# Patient Record
Sex: MALE | Race: WHITE
[De-identification: names, ages, dates, MRNs, and addresses within clinical notes are randomized per-mention and may not be internally consistent; named-entity substitution may affect disease eponyms.]

---

## 2017-12-18 ENCOUNTER — HOSPITAL ENCOUNTER (OUTPATIENT)
Dept: HOSPITAL 45 - C.RAD | Age: 62
Discharge: HOME | End: 2017-12-18
Attending: SURGERY
Payer: COMMERCIAL

## 2017-12-18 DIAGNOSIS — K44.9: Primary | ICD-10-CM

## 2017-12-18 DIAGNOSIS — Z98.890: ICD-10-CM

## 2017-12-18 DIAGNOSIS — K21.9: ICD-10-CM

## 2017-12-18 NOTE — DIAGNOSTIC IMAGING REPORT
GI SERIES W/AIR ROUTINE



CLINICAL HISTORY: 62 years-old Male with HIATUS HERNIA.  Prior hiatal hernia

repair. Recent endoscopy with Sawyer's esophagus



TECHNIQUE:  A standard air contrast upper GI series was performed following

administration of barium and effervescent crystals.  Multiple spot fluoroscopic

images were obtained and provided for review.



COMPARISON STUDY: None available



FLUOROSCOPY TIME: 2.0 minutes.



FINDINGS: 

   

The patient swallowed barium without difficulty.  No aspiration was definitively

visualized.  The esophagus distended normally with barium and effervescent

crystals. Postsurgical changes of the stomach from prior Nissen fundoplication.

The entire Nissen wrap appears to be above the level of the diaphragm. There is

a large portion of the stomach which is extruded along the right lateral margin

of the wrap as seen on image 20 of 23 suggesting failed fundoplication. The

distal esophagus is patulous. There was significant gastroesophageal reflux

which extended to the upper thoracic esophagus.  Barium was seen to flow freely

through the gastroesophageal junction.   Evaluation of the stomach demonstrates

no gastric mucosal irregularity or filling defect.



The duodenal bulb and sweep appear unremarkable.  



IMPRESSION:

Prior Nissen fundoplication with findings as above suggesting failed Nissen wrap

with patulous distal esophagus and significant gastroesophageal reflux











The above report was generated using voice recognition software. It may contain

grammatical, syntax or spelling errors.











Electronically signed by:  David García M.D.

12/18/2017 9:44 AM



Dictated Date/Time:  12/18/2017 9:25 AM

## 2018-04-08 ENCOUNTER — HOSPITAL ENCOUNTER (EMERGENCY)
Dept: HOSPITAL 45 - C.EDA | Age: 63
Discharge: ANOTHER HOSPITAL | End: 2018-04-08
Payer: COMMERCIAL

## 2018-04-08 VITALS
HEIGHT: 70.98 IN | WEIGHT: 244.27 LBS | HEIGHT: 70.98 IN | WEIGHT: 244.27 LBS | BODY MASS INDEX: 34.2 KG/M2 | BODY MASS INDEX: 34.2 KG/M2

## 2018-04-08 VITALS
HEART RATE: 70 BPM | OXYGEN SATURATION: 97 % | SYSTOLIC BLOOD PRESSURE: 119 MMHG | TEMPERATURE: 98.24 F | DIASTOLIC BLOOD PRESSURE: 68 MMHG

## 2018-04-08 DIAGNOSIS — Z98.84: ICD-10-CM

## 2018-04-08 DIAGNOSIS — K22.70: ICD-10-CM

## 2018-04-08 DIAGNOSIS — Z98.890: ICD-10-CM

## 2018-04-08 DIAGNOSIS — K92.2: Primary | ICD-10-CM

## 2018-04-08 LAB
ALBUMIN SERPL-MCNC: 3.1 GM/DL (ref 3.4–5)
ALP SERPL-CCNC: 40 U/L (ref 45–117)
ALT SERPL-CCNC: 20 U/L (ref 12–78)
AST SERPL-CCNC: 10 U/L (ref 15–37)
BASOPHILS # BLD: 0.02 K/UL (ref 0–0.2)
BASOPHILS NFR BLD: 0.1 %
BUN SERPL-MCNC: 33 MG/DL (ref 7–18)
CA-I BLD-SCNC: 1.14 MMOL/L (ref 1.12–1.32)
CALCIUM SERPL-MCNC: 8.5 MG/DL (ref 8.5–10.1)
CO2 SERPL-SCNC: 26 MMOL/L (ref 21–32)
CREAT BLD-MCNC: 1.2 MG/DL (ref 0.6–1.3)
CREAT SERPL-MCNC: 1.26 MG/DL (ref 0.6–1.4)
EOS ABS #: 0.2 K/UL (ref 0–0.5)
EOSINOPHIL NFR BLD AUTO: 284 K/UL (ref 130–400)
GLUCOSE SERPL-MCNC: 218 MG/DL (ref 70–99)
HCT VFR BLD CALC: 35.5 % (ref 42–52)
HGB BLD-MCNC: 12 G/DL (ref 14–18)
IG#: 0.05 K/UL (ref 0–0.02)
IMM GRANULOCYTES NFR BLD AUTO: 15.9 %
INR PPP: 1 (ref 0.9–1.1)
ISTAT POTASSIUM: 3.6 MEQ/L (ref 3.3–5)
LYMPHOCYTES # BLD: 2.3 K/UL (ref 1.2–3.4)
MCH RBC QN AUTO: 30.8 PG (ref 25–34)
MCHC RBC AUTO-ENTMCNC: 33.8 G/DL (ref 32–36)
MCV RBC AUTO: 91.3 FL (ref 80–100)
MONO ABS #: 0.64 K/UL (ref 0.11–0.59)
MONOCYTES NFR BLD: 4.4 %
NEUT ABS #: 11.27 K/UL (ref 1.4–6.5)
NEUTROPHILS # BLD AUTO: 1.4 %
NEUTROPHILS NFR BLD AUTO: 77.9 %
PMV BLD AUTO: 9 FL (ref 7.4–10.4)
POTASSIUM SERPL-SCNC: 3.6 MMOL/L (ref 3.5–5.1)
PROT SERPL-MCNC: 6.4 GM/DL (ref 6.4–8.2)
PTT PATIENT: 21.7 SECONDS (ref 21–31)
RED CELL DISTRIBUTION WIDTH CV: 13.6 % (ref 11.5–14.5)
RED CELL DISTRIBUTION WIDTH SD: 45.6 FL (ref 36.4–46.3)
SODIUM BLD-SCNC: 140 MEQ/L (ref 135–144)
SODIUM SERPL-SCNC: 138 MMOL/L (ref 136–145)
WBC # BLD AUTO: 14.48 K/UL (ref 4.8–10.8)

## 2018-04-08 NOTE — DIAGNOSTIC IMAGING REPORT
CHEST ONE VIEW PORTABLE



CLINICAL HISTORY: Syncope. Upper gastrointestinal hemorrhage.    



COMPARISON STUDY:  12/12/2016



FINDINGS: The heart is at the upper limits of normal in size. There is a

retrocardiac opacity consistent with a hiatal hernia. There are linear by

basilar opacities, likely represent subsegmental atelectasis. There is no

failure. There are no pleural effusions. There is no free intraperitoneal air.[ 



IMPRESSION: 

1. Bibasilar subsegmental atelectatic change







Electronically signed by:  Dalton Maria M.D.

4/8/2018 6:42 AM



Dictated Date/Time:  4/8/2018 6:41 AM

## 2018-04-08 NOTE — EMERGENCY ROOM VISIT NOTE
History


Report prepared by Mohini:  Gama Huynh


Under the Supervision of:  Dr. Maci Armando M.D.


First contact with patient:  02:03


Stated Complaint:  SYNCOPE/GI BLEED





History of Present Illness


The patient is a 63 year old male who presents to the Emergency Room brought in 

by EMS with complaints of episodic vomiting x3 with bright red blood about one 

hour ago. He recently had an ablation treatment for Sawyer's Esophagus on 

March 27, 2018. The patient notes that he got up to go to the bathroom and then 

passed out. He was incontinent of formed stool.  He notes that when he came to 

he began vomiting bright red blood. He reports being incontinent of his stool. 

Per EMS, the patient had 3 syncopal episodes en route to the ED with additional 

hematemesis. He denies any liver problems or other underlying medical problems.





   Source of History:  patient


   Onset:  one hour ago


   Position:  other (general)


   Quality:  other (vomiting blood)


   Timing:  other (episodic)


   Associated Symptoms:  + LOC


Note:


He notes syncope and incontinent of his stool





Review of Systems


See HPI for pertinent positives & negatives. A total of 10 systems reviewed and 

were otherwise negative.





Past Medical & Surgical


Medical Problems:


(1) Sawyer esophagus


Surgical Problems:


(1) History of prior ablation treatment








Family History





No pertinent family history





Social History


Marital Status:  


Housing Status:  lives with significant other


Occupation Status:  employed





Current/Historical Medications


Scheduled


Ascorbic Acid (Vitamin C), 250 MG PO DAILY


Cholecalciferol (Vitamin D3), 1 TAB PO DAILY


Omega-3 Fatty Acids (Fish Oil 1200 mg), 1 CAP PO BID


Omeprazole (Prilosec), 40 MG PO DAILY


Sucralfate (Carafate), 1 GM PO QID


Vitamin E (Vitamin E 400 Iu), 400 INTER.UNIT PO DAILY





Allergies


Coded Allergies:  


     No Known Allergies (Unverified , 4/8/18)





Physical Exam


Vital Signs











  Date Time  Temp Pulse Resp B/P (MAP) Pulse Ox O2 Delivery O2 Flow Rate FiO2


 


4/8/18 04:38 36.8 70 20 119/68 97   


 


4/8/18 04:11  70 20 119/68 97 Nasal Cannula 2.0 


 


4/8/18 03:51  71 21 129/70 97 Nasal Cannula 2.0 


 


4/8/18 02:46    110/76    


 


4/8/18 02:44    109/78    


 


4/8/18 02:31        





    97/56    


 


4/8/18 02:30  68 15  96 Nasal Cannula 2.0 


 


4/8/18 02:24    106/61    


 


4/8/18 02:13  74      


 


4/8/18 02:08 36.8 75 18 123/70 89 Room Air  


 


4/8/18 02:01    123/70    











Physical Exam


Vital signs reviewed.





General: Pale-appearing, clammy, in no significant distress. Noted to be mildly 

hypotensive. Emesis basin with bright red blood.





HEENT: No scleral icterus, PERRLA, neck supple.  Atraumatic. Pale conjunctiva. 

Old blood in the mouth.





Cardiovascular: Regular rate and rhythm, no extra sounds.





Pulmonary: Clear to auscultation bilaterally, normal work of breathing.





Abdomen: Soft, nontender, nondistended, positive bowel sounds.





Musculoskeletal: Atraumatic, no peripheral edema. Dried blood noted to anterior 

chest wall





Neurologic: Patient awake alert and oriented x 3





Skin: Warm, dry, no rash





Medical Decision & Procedures


ER Provider


Diagnostic Interpretation:


Radiology results as stated below per my review and radiologist interpretation:





CHEST ONE VIEW PORTABLE





CLINICAL HISTORY: Syncope. Upper gastrointestinal hemorrhage.    





COMPARISON STUDY:  12/12/2016





FINDINGS: The heart is at the upper limits of normal in size. There is a


retrocardiac opacity consistent with a hiatal hernia. There are linear by


basilar opacities, likely represent subsegmental atelectasis. There is no


failure. There are no pleural effusions. There is no free intraperitoneal air.





IMPRESSION: 


1. Bibasilar subsegmental atelectatic change











Electronically signed by:  Dalton Maria M.D.


4/8/2018 6:42 AM





Dictated Date/Time:  4/8/2018 6:41 AM





Laboratory Results


4/8/18 02:10








Red Blood Count 3.89, Mean Corpuscular Volume 91.3, Mean Corpuscular Hemoglobin 

30.8, Mean Corpuscular Hemoglobin Concent 33.8, Mean Platelet Volume 9.0, 

Neutrophils (%) (Auto) 77.9, Lymphocytes (%) (Auto) 15.9, Monocytes (%) (Auto) 

4.4, Eosinophils (%) (Auto) 1.4, Basophils (%) (Auto) 0.1, Neutrophils # (Auto) 

11.27, Lymphocytes # (Auto) 2.30, Monocytes # (Auto) 0.64, Eosinophils # (Auto) 

0.20, Basophils # (Auto) 0.02





4/8/18 02:10

















Test


  4/8/18


02:10 4/8/18


02:14


 


White Blood Count


  14.48 K/uL


(4.8-10.8) 


 


 


Red Blood Count


  3.89 M/uL


(4.7-6.1) 


 


 


Hemoglobin


  12.0 g/dL


(14.0-18.0) 


 


 


Hematocrit 35.5 % (42-52)  


 


Mean Corpuscular Volume


  91.3 fL


() 


 


 


Mean Corpuscular Hemoglobin


  30.8 pg


(25-34) 


 


 


Mean Corpuscular Hemoglobin


Concent 33.8 g/dl


(32-36) 


 


 


Platelet Count


  284 K/uL


(130-400) 


 


 


Mean Platelet Volume


  9.0 fL


(7.4-10.4) 


 


 


Neutrophils (%) (Auto) 77.9 %  


 


Lymphocytes (%) (Auto) 15.9 %  


 


Monocytes (%) (Auto) 4.4 %  


 


Eosinophils (%) (Auto) 1.4 %  


 


Basophils (%) (Auto) 0.1 %  


 


Neutrophils # (Auto)


  11.27 K/uL


(1.4-6.5) 


 


 


Lymphocytes # (Auto)


  2.30 K/uL


(1.2-3.4) 


 


 


Monocytes # (Auto)


  0.64 K/uL


(0.11-0.59) 


 


 


Eosinophils # (Auto)


  0.20 K/uL


(0-0.5) 


 


 


Basophils # (Auto)


  0.02 K/uL


(0-0.2) 


 


 


RDW Standard Deviation


  45.6 fL


(36.4-46.3) 


 


 


RDW Coefficient of Variation


  13.6 %


(11.5-14.5) 


 


 


Immature Granulocyte % (Auto) 0.3 %  


 


Immature Granulocyte # (Auto)


  0.05 K/uL


(0.00-0.02) 


 


 


Prothrombin Time


  10.7 SECONDS


(9.0-12.0) 


 


 


Prothromb Time International


Ratio 1.0 (0.9-1.1) 


  


 


 


Activated Partial


Thromboplast Time 21.7 SECONDS


(21.0-31.0) 


 


 


Partial Thromboplastin Ratio 0.8  


 


Est Creatinine Clear Calc


Drug Dose 75.0 ml/min 


  


 


 


Estimated GFR (


American) 69.4 


  


 


 


Estimated GFR (Non-


American 59.9 


  


 


 


BUN/Creatinine Ratio 26.0 (10-20)  


 


Calcium Level


  8.5 mg/dl


(8.5-10.1) 


 


 


Total Bilirubin


  0.7 mg/dl


(0.2-1) 


 


 


Direct Bilirubin


  0.1 mg/dl


(0-0.2) 


 


 


Aspartate Amino Transf


(AST/SGOT) 10 U/L (15-37) 


  


 


 


Alanine Aminotransferase


(ALT/SGPT) 20 U/L (12-78) 


  


 


 


Alkaline Phosphatase


  40 U/L


() 


 


 


Total Protein


  6.4 gm/dl


(6.4-8.2) 


 


 


Albumin


  3.1 gm/dl


(3.4-5.0) 


 


 


Bedside Hemoglobin


  


  11.2 g/dl


(14.0-18.0)


 


Bedside Hematocrit  33 % (42-52) 


 


Bedside Sodium


  


  140 mEq/L


(135-144)


 


Bedside Potassium


  


  3.6 mEq/L


(3.3-5.0)


 


Bedside Chloride


  


  102 mEq/L


(101-112)


 


Bedside Total CO2


  


  24 mEq/l


(24-31)


 


Anion Gap


  


  19.0 mmol/L


(16-25)


 


Bedside Blood Urea Nitrogen


  


  31 mg/dl


(7-18)


 


Bedside Creatinine


  


  1.2 mg/dl


(0.6-1.3)


 


Bedside Glucose (other)


  


  226 mg/dl


(70-99)


 


Bedside Ionized Calcium (Patrick)


  


  1.14 mmol/l


(1.12-1.32)





Laboratory results per my review.





Medications Administered











 Medications


  (Trade)  Dose


 Ordered  Sig/Marni


 Route  Start Time


 Stop Time Status Last Admin


Dose Admin


 


 Sodium Chloride  1,000 ml @ 


 125 mls/hr  Q8H STAT


 IV  4/8/18 02:07


 4/8/18 05:13 DC 4/8/18 02:07


125 MLS/HR


 


 Pantoprazole


 Sodium 80 mg/


 Dextrose  120 ml @ 


 480 mls/hr  NOW  STAT


 IV  4/8/18 02:16


 4/8/18 02:30 DC 4/8/18 02:32


480 MLS/HR


 


 Pantoprazole


 Sodium 40 mg/


 Dextrose  100 ml @ 


 20 mls/hr  Q5H


 IV  4/8/18 02:30


 4/8/18 05:13 DC 4/8/18 02:32


20 MLS/HR











ECG Per My Interpretation


Indication:  syncope


Rate (beats per minute):  76


Rhythm:  normal sinus


Findings:  nonspecific-ST abn, no ectopy





ED Course


0200: Past medical records reviewed. The patient was evaluated in room A1. A 

complete history and physical examination was performed. 





0207: Ordered Sodium Chloride 1,000 ml @ 125 mls/hr IV





0210: Ordered Protonix IV Bolus/Drip 1 ea IV 





0211: I spoke with Dr. Bray, gastroenterologist. We discussed the patient's 

case. He recommends continuing resuscitation and they will scope the patient in 

a few hours. 





0216: Ordered Pantoprazole Sodium 80 mg/Dextrose 120 ml @ 480 mls/hr IV





0230: Ordered Pantoprazole Sodium 80 mg/Dextrose 100 ml @ 20 mls/hr IV





0243: I reassessed the patient at this time. He has not vomited while here in 

the ED. 





0317: I spoke with Dr. Bray, gastroenterologist. We discussed the patient's 

case. He states the surgeon who did the patients surgery accepted the patient 

for transfer. He spoke with Dr. Ami Hirsch, gastroenterologist who 

accepted the patient. The patient will be transferred to [facility].





0334: I reassessed the patient at this time. I discussed the results and 

treatment plan with the patient. I answered all pertaining questions that he 

had. He expressed understanding and verbalized agreement. The patient will be 

transferred for further care.





0356: I spoke with Dr. Grigsby, critical care at [facility]. They 

accepted the patient.





Medical Decision


Differential Diagnoses include: UGI bleed secondary to eroded vessel, PUD, polyp





This pt was evaluated and appeared to be in no distress.  He was noted to have 

a hypotensive episode in the ED after several syncopal episodes in route.  Pt 

is feeling improved.  GI, Dr Bray was consulted upon arrival.  Lab work was 

pending.  Pt was type and crossed for 2 units to hold.  ISTAT reveals a stable H

/H.  The etiology of the sudden bleed is unclear.  After d/w GI at [facility] by Dr Bray transfer to their facility was recommended.  The pt was 

accepted by Dr Grigsby of the ICU.  At this time, ground transportation is 

several hours until arrival in this ED, making pt's arrival in [place] > 4-5 

hours from now.  The pt is currently appearing well, making his syncope likely 

vagal in nature.  There is great concern on the part of Dr Bray, Dr. Ami Hirsch ([facility]) and myself that the pt will become unstable 

upon a rebleed in the near future.  Air transport was requested and the pt was 

sent to [facility] ICU for further management.  He wand his family were aware of the 

plan and agreed.





Medication Reconcilliation


Current Medication List:  was personally reviewed by me





Blood Pressure Screening


Patient's blood pressure:  Normal blood pressure





Consults


Time Called:  0208


Consulting Physician:  Dr. Bray, gastroenterologist


Returned Call:  0211


I spoke with Dr. Bray, gastroenterologist. We discussed the patient's case. He 

recommends continuing resuscitation and they will scope the patient in a few 

hours. 





0317: I spoke with Dr. Bray, gastroenterologist. We discussed the patient's 

case. He states the surgeon who did the patients surgery accepted the patient 

for transfer. He spoke with Dr. Ami Hirsch, gastroenterologist who 

accepted the patient. The patient will be transferred to [facility].


Additional Consults:  


   Time Called:  0345


   Consulted Physician:  Dr. Grigsby, critical care at [facility]


   Returned Call:  0356


Additional Comments:


I spoke with Dr. Grigsby, critical care at [facility]. They accepted 

the patient.





Impression





 Primary Impression:  


 Upper GI bleed


 Additional Impressions:  


 History of prior ablation treatment


 Sawyer esophagus





Critical Care


I have personally spent greater than 35 minutes of critical care time in the 

direct management of this patient.  This includes bedside care, interpretation 

of diagnostic studies, and testing, discussion with consultants, patient, and 

family members, and other required patient management activities.  This 35 

minutes is in excess of all separately billable procedures.





Scribe Attestation


The scribe's documentation has been prepared under my direction and personally 

reviewed by me in its entirety. I confirm that the note above accurately 

reflects all work, treatment, procedures, and medical decision making performed 

by me.





Departure Information


Dispostion


Transfer Acute Care Facility ([facility])





Referrals


Gris Peterson (PCP)





Problem Qualifiers

## 2018-04-08 NOTE — GASTROINTESTINAL CONSULTATION
Gastrointestinal Consultation


Date of Consultation:  Apr 8, 2018


History of Present Illness


Patient is a 63 year old male with a history of Sawyer's esophagus with 

dysplasia who presented for open access endoscopy and [place] in March 29.  He 

underwent nitrous oxide balloon cryoablation therapy for C4M5 Sawyer's 

esophagus.  Since that time he has had this difficulty swallowing.





This evening he went to bed and woke up in the middle night with crampy pain in 

his lower chest.  He then went to the bathroom felt very dizzy lightheaded, had 

bright red hematemesis and then his wife said that he just melted into the 

floor.  He had several episodes of hematemesis at home she called EMS, and, 

transferred to [facility].





While in the ER transport he again had 2-3 bouts of hematemesis and had syncopal

/presyncopal events.  Although I am unaware if he was hypertensive during his 

episodes.  Here in the emergency room he has been started on IV fluids 

resuscitated, he is awake and alert, has had no further vomiting of blood since 

in the ER. 





No pain, no fevers, chills, he does have a history a lap band and is thinking 

having it removed.





In the ER his BP is 118/70 and HR is 65-75 Hb is 12 and INR is 1.





I spoke with [facility] in regards to bleeding after ablation procedure, given the 

complexity of recent ablation, possible stricture complication afterwards, they 

have requested him to be transferred. 


Patient is agreeable.





Past Medical/Surgical History


Obesity


Lap gastric band


Past Surgical History:


Lap gastric band





Social History


Smoking Status:  Never Smoker





Allergies


Coded Allergies:  


     No Known Allergies (Unverified , 4/8/18)





Current Medications





Home Meds and Scripts








 Medications  Dose


 Route/Sig


 Max Daily Dose Days Date Category


 


 Vitamin D3


  (Cholecalciferol)


 1,000 Unit Tab  1 Tab


 PO DAILY


    4/8/18 Reported


 


 Vitamin E 400 Iu


  (Vitamin E) 400


 Unit Cap  400 Inter.unit


 PO DAILY


    4/8/18 Reported


 


 Vitamin C


  (Ascorbic Acid)


 250 Mg Tab  250 Mg


 PO DAILY


    4/8/18 Reported


 


 Prilosec


  (Omeprazole) 20


 Mg Capcr  40 Mg


 PO DAILY


    4/8/18 Reported


 


 Carafate


  (Sucralfate) 1 Gm


 Tab  1 Gm


 PO QID


    4/8/18 Reported


 


 Fish Oil 1200 mg


  (Omega-3 Fatty


 Acids) 1 Cap Cap  1 Cap


 PO BID


    4/8/18 Reported











Review of Systems


Constitutional:  + see HPI


Eyes:  No see HPI, No worsening of vision, No eye pain, No redness, No discharge

, No diplopia, No problem reported


ENT:  No see HPI, No hearing loss, No unusual epistaxis, No nasal symptoms, No 

sore throat, No tinnitus, No dental problems, No trouble swallowing, No pain on 

swallowing, No problem reported


Respiratory:  No see HPI, No cough, No sputum, No wheezing, No shortness of 

breath, No dyspnea on exertion, No dyspnea at rest, No hemoptysis, No problem 

reported


Cardiac:  No see HPI, No chest pain, No orthopnea, No PND, No edema, No 

claudication, No palpitations, No problem reported


Musculoskeletal:  No see HPI, No joint pain, No muscle pain, No swelling, No 

calf pain, No problem reported


Male :  No see HPI, No dysuria, No urinary frequency, No incontinence, No 

nocturia more than once/night, No slowing stream, No hematuria, No sexual 

dysfunction, No problem reported





Physical Exam











  Date Time  Temp Pulse Resp B/P (MAP) Pulse Ox O2 Delivery O2 Flow Rate FiO2


 


4/8/18 02:46    110/76    


 


4/8/18 02:44    109/78    


 


4/8/18 02:31        





    97/56    


 


4/8/18 02:30  68 15  96 Nasal Cannula 2.0 


 


4/8/18 02:24    106/61    


 


4/8/18 02:13  74      


 


4/8/18 02:08 36.8 75 18 123/70 89 Room Air  


 


4/8/18 02:01    123/70    








General Appearance:  WD/WN


Respiratory/Chest:  chest non-tender, lungs clear, normal breath sounds


Cardiovascular:  regular rate, rhythm, no edema, no gallop


Abdomen:  normal bowel sounds, non tender, soft, no organomegaly


Extremities:  normal range of motion, non-tender


Neurologic/Psych:  CNs II-XII nml as tested, no motor/sensory deficits


Skin:  normal color





Laboratory Results





Last 24 Hours








Test


  4/8/18


02:10 4/8/18


02:14


 


White Blood Count 14.48 K/uL  


 


Red Blood Count 3.89 M/uL  


 


Hemoglobin 12.0 g/dL  


 


Hematocrit 35.5 %  


 


Mean Corpuscular Volume 91.3 fL  


 


Mean Corpuscular Hemoglobin 30.8 pg  


 


Mean Corpuscular Hemoglobin


Concent 33.8 g/dl 


  


 


 


Platelet Count 284 K/uL  


 


Mean Platelet Volume 9.0 fL  


 


Neutrophils (%) (Auto) 77.9 %  


 


Lymphocytes (%) (Auto) 15.9 %  


 


Monocytes (%) (Auto) 4.4 %  


 


Eosinophils (%) (Auto) 1.4 %  


 


Basophils (%) (Auto) 0.1 %  


 


Neutrophils # (Auto) 11.27 K/uL  


 


Lymphocytes # (Auto) 2.30 K/uL  


 


Monocytes # (Auto) 0.64 K/uL  


 


Eosinophils # (Auto) 0.20 K/uL  


 


Basophils # (Auto) 0.02 K/uL  


 


RDW Standard Deviation 45.6 fL  


 


RDW Coefficient of Variation 13.6 %  


 


Immature Granulocyte % (Auto) 0.3 %  


 


Immature Granulocyte # (Auto) 0.05 K/uL  


 


Prothrombin Time 10.7 SECONDS  


 


Prothromb Time International


Ratio 1.0 


  


 


 


Activated Partial


Thromboplast Time 21.7 SECONDS 


  


 


 


Partial Thromboplastin Ratio 0.8  


 


Sodium Level 138 mmol/L  


 


Potassium Level 3.6 mmol/L  


 


Chloride Level 106 mmol/L  


 


Carbon Dioxide Level 26 mmol/L  


 


Anion Gap 6.0 mmol/L  19.0 mmol/L 


 


Blood Urea Nitrogen 33 mg/dl  


 


Creatinine 1.26 mg/dl  


 


Est Creatinine Clear Calc


Drug Dose 75.0 ml/min 


  


 


 


Estimated GFR (


American) 69.4 


  


 


 


Estimated GFR (Non-


American 59.9 


  


 


 


BUN/Creatinine Ratio 26.0  


 


Random Glucose 218 mg/dl  


 


Calcium Level 8.5 mg/dl  


 


Total Bilirubin 0.7 mg/dl  


 


Direct Bilirubin 0.1 mg/dl  


 


Aspartate Amino Transf


(AST/SGOT) 10 U/L 


  


 


 


Alanine Aminotransferase


(ALT/SGPT) 20 U/L 


  


 


 


Alkaline Phosphatase 40 U/L  


 


Total Protein 6.4 gm/dl  


 


Albumin 3.1 gm/dl  


 


Bedside Hemoglobin  11.2 g/dl 


 


Bedside Hematocrit  33 % 


 


Bedside Sodium  140 mEq/L 


 


Bedside Potassium  3.6 mEq/L 


 


Bedside Chloride  102 mEq/L 


 


Bedside Total CO2  24 mEq/l 


 


Bedside Blood Urea Nitrogen  31 mg/dl 


 


Bedside Creatinine  1.2 mg/dl 


 


Bedside Glucose (other)  226 mg/dl 


 


Bedside Ionized Calcium (Patrick)  1.14 mmol/l 











Impression


Patient is a 63 year old male presented with hematemesis after recent Sawyer's 

esophagus ablation.





Discussed the case with physician who performed the most recent endoscopy in 

[place] who is on-call tonight, given the complexity of the ablation therapy 

in the setting of bleeding, which is uncommon, they requested him to be 

transferred there.  We are arranging for that now.





Plan


IV PPI


N.p.o.


Transfer to tertiary center